# Patient Record
Sex: FEMALE | Race: WHITE | NOT HISPANIC OR LATINO | URBAN - METROPOLITAN AREA
[De-identification: names, ages, dates, MRNs, and addresses within clinical notes are randomized per-mention and may not be internally consistent; named-entity substitution may affect disease eponyms.]

---

## 2018-04-13 ENCOUNTER — OFFICE (OUTPATIENT)
Dept: URBAN - METROPOLITAN AREA CLINIC 33 | Facility: CLINIC | Age: 43
End: 2018-04-13

## 2018-04-13 VITALS
TEMPERATURE: 97.7 F | HEIGHT: 69 IN | HEART RATE: 79 BPM | DIASTOLIC BLOOD PRESSURE: 93 MMHG | WEIGHT: 165 LBS | SYSTOLIC BLOOD PRESSURE: 135 MMHG

## 2018-04-13 DIAGNOSIS — K50.80 CROHN'S DISEASE OF BOTH SMALL AND LARGE INTESTINE WITHOUT CO: ICD-10-CM

## 2018-04-13 PROCEDURE — 99214 OFFICE O/P EST MOD 30 MIN: CPT

## 2018-04-13 RX ORDER — AZATHIOPRINE 50 MG/1
TABLET ORAL
Qty: 60 | Refills: 3 | Status: COMPLETED
End: 2022-09-23

## 2018-04-13 NOTE — SERVICEHPINOTES
INEZ CENTENO   is a   42  female who presents for Crohn's f/u. She was first diagnosed with Crohn's in 1994. Her last colonoscopy was 11/29/2016-no active colitis with biopsies throughout colon-repeat in 2 years. She has been taking Imuran 50mg 2 tablets since around 1997 or 1998. No side effects. She tried going off it in 2008 because she was trying to get pregnant and she got really sick and went back on it. She has a BM 2-3 times a day. Stools are [place] stool scale type 4-5. No blood present. She has been in remission since 2005 after she had her daughter. Denies joint pain, night sweats, abdominal pain and weight loss. No vision complaints. She quit smoking in 2003. She is up to date the Pap smear. She hasn't had the flu vaccine.

## 2019-04-24 ENCOUNTER — OFFICE (OUTPATIENT)
Dept: URBAN - METROPOLITAN AREA CLINIC 33 | Facility: CLINIC | Age: 44
End: 2019-04-24

## 2019-04-24 VITALS
DIASTOLIC BLOOD PRESSURE: 79 MMHG | WEIGHT: 164 LBS | HEART RATE: 85 BPM | TEMPERATURE: 97.9 F | SYSTOLIC BLOOD PRESSURE: 124 MMHG | HEIGHT: 69 IN

## 2019-04-24 DIAGNOSIS — K50.80 CROHN'S DISEASE OF BOTH SMALL AND LARGE INTESTINE WITHOUT CO: ICD-10-CM

## 2019-04-24 DIAGNOSIS — K21.9 GASTRO-ESOPHAGEAL REFLUX DISEASE WITHOUT ESOPHAGITIS: ICD-10-CM

## 2019-04-24 PROCEDURE — 99214 OFFICE O/P EST MOD 30 MIN: CPT

## 2019-04-24 PROCEDURE — 00031: CPT

## 2019-06-18 ENCOUNTER — OFFICE (OUTPATIENT)
Dept: URBAN - METROPOLITAN AREA PATHOLOGY 17 | Facility: PATHOLOGY | Age: 44
End: 2019-06-18

## 2019-06-18 ENCOUNTER — OFFICE (OUTPATIENT)
Dept: URBAN - METROPOLITAN AREA CLINIC 32 | Facility: CLINIC | Age: 44
End: 2019-06-18

## 2019-06-18 VITALS
SYSTOLIC BLOOD PRESSURE: 114 MMHG | OXYGEN SATURATION: 98 % | DIASTOLIC BLOOD PRESSURE: 75 MMHG | HEART RATE: 78 BPM | HEART RATE: 83 BPM | RESPIRATION RATE: 13 BRPM | RESPIRATION RATE: 16 BRPM | DIASTOLIC BLOOD PRESSURE: 102 MMHG | DIASTOLIC BLOOD PRESSURE: 74 MMHG | SYSTOLIC BLOOD PRESSURE: 117 MMHG | HEART RATE: 77 BPM | SYSTOLIC BLOOD PRESSURE: 111 MMHG | HEART RATE: 91 BPM | DIASTOLIC BLOOD PRESSURE: 91 MMHG | RESPIRATION RATE: 15 BRPM | DIASTOLIC BLOOD PRESSURE: 92 MMHG | TEMPERATURE: 97.5 F | HEIGHT: 69 IN | HEART RATE: 71 BPM | TEMPERATURE: 98.1 F | OXYGEN SATURATION: 100 % | RESPIRATION RATE: 14 BRPM | DIASTOLIC BLOOD PRESSURE: 79 MMHG | DIASTOLIC BLOOD PRESSURE: 71 MMHG | HEART RATE: 81 BPM | SYSTOLIC BLOOD PRESSURE: 135 MMHG | SYSTOLIC BLOOD PRESSURE: 153 MMHG | RESPIRATION RATE: 12 BRPM | DIASTOLIC BLOOD PRESSURE: 84 MMHG | SYSTOLIC BLOOD PRESSURE: 118 MMHG | SYSTOLIC BLOOD PRESSURE: 137 MMHG | WEIGHT: 164 LBS | SYSTOLIC BLOOD PRESSURE: 119 MMHG | DIASTOLIC BLOOD PRESSURE: 80 MMHG | DIASTOLIC BLOOD PRESSURE: 76 MMHG | SYSTOLIC BLOOD PRESSURE: 127 MMHG | HEART RATE: 80 BPM

## 2019-06-18 DIAGNOSIS — K21.9 GASTRO-ESOPHAGEAL REFLUX DISEASE WITHOUT ESOPHAGITIS: ICD-10-CM

## 2019-06-18 DIAGNOSIS — R14.1 GAS PAIN: ICD-10-CM

## 2019-06-18 DIAGNOSIS — K29.60 OTHER GASTRITIS WITHOUT BLEEDING: ICD-10-CM

## 2019-06-18 DIAGNOSIS — K62.89 OTHER SPECIFIED DISEASES OF ANUS AND RECTUM: ICD-10-CM

## 2019-06-18 DIAGNOSIS — K50.80 CROHN'S DISEASE OF BOTH SMALL AND LARGE INTESTINE WITHOUT CO: ICD-10-CM

## 2019-06-18 DIAGNOSIS — K63.89 OTHER SPECIFIED DISEASES OF INTESTINE: ICD-10-CM

## 2019-06-18 PROBLEM — K44.9 DIAPHRAGMATIC HERNIA WITHOUT OBSTRUCTION OR GANGRENE: Status: ACTIVE | Noted: 2019-06-18

## 2019-06-18 PROBLEM — K31.89 OTHER DISEASES OF STOMACH AND DUODENUM: Status: ACTIVE | Noted: 2019-06-18

## 2019-06-18 PROBLEM — K29.70 GASTRITIS, UNSPECIFIED, WITHOUT BLEEDING: Status: ACTIVE | Noted: 2019-06-18

## 2019-06-18 PROCEDURE — 45380 COLONOSCOPY AND BIOPSY: CPT

## 2019-06-18 PROCEDURE — 88305 TISSUE EXAM BY PATHOLOGIST: CPT

## 2019-06-18 PROCEDURE — 88313 SPECIAL STAINS GROUP 2: CPT

## 2019-06-18 PROCEDURE — 88312 SPECIAL STAINS GROUP 1: CPT

## 2019-06-18 PROCEDURE — 43239 EGD BIOPSY SINGLE/MULTIPLE: CPT

## 2019-06-18 PROCEDURE — 88342 IMHCHEM/IMCYTCHM 1ST ANTB: CPT

## 2020-06-08 ENCOUNTER — TELEHEALTH PROVIDED OTHER THAN IN PATIENT'S HOME (OUTPATIENT)
Dept: URBAN - METROPOLITAN AREA TELEHEALTH 3 | Facility: TELEHEALTH | Age: 45
End: 2020-06-08

## 2020-06-08 VITALS — WEIGHT: 145 LBS | HEIGHT: 69 IN

## 2020-06-08 DIAGNOSIS — K50.80 CROHN'S DISEASE OF BOTH SMALL AND LARGE INTESTINE WITHOUT CO: ICD-10-CM

## 2020-06-08 DIAGNOSIS — D64.9 ANEMIA, UNSPECIFIED: ICD-10-CM

## 2020-06-08 PROCEDURE — 99214 OFFICE O/P EST MOD 30 MIN: CPT | Mod: 95

## 2020-06-08 RX ORDER — AZATHIOPRINE 50 MG/1
TABLET ORAL
Qty: 60 | Refills: 3 | Status: COMPLETED
End: 2022-09-23

## 2020-06-08 NOTE — SERVICENOTES
I have reviewed the history, physical exam, assessment and management plans.  I concur with or have edited all elements of her note.

 Patient's visit was conducted through [employer]. Patient consented before the start of visit as to understanding of privacy concerns, possible technological failure, and their responsibility of carrying out instructions of plan.

## 2020-06-08 NOTE — SERVICEHPINOTES
PATIENT VERIFIED BY DATE OF BIRTH AND NAME. Patient has been consented for this telecommunication visit. Dori presents for crohn's disease follow up. She was first diagnosed with Crohn's in 1994. Has been on Imuran 100mg qd since around 1998. Her last colonoscopy was 6/2018 normal colon and TI biopsies, recall 2 years. EGD done at the same time for GERD sx showed mild gastritis and reflux changes but no IM/ansari's.Ondina denies any ADR's. No flares since she had her daughter 15 years ago. BMs regular and daily. Denies diarrhea, constipation, rectal bleeding, abd pain, weight loss. No joint pain, rashes, or vision complaints. Her daughter, age 15, also has Crohn's. Ondina continues on omeprazole 20mg qd for GERD symptoms. She actually stopped drinking alcohol completely last July and has not had any issues with heartburn. was drinking wine nightly. Denies abd pain, dysphagia, or n/v. Ondina just completed labs a couple days ago--normal CMP, hgb-10.4 with normal MCV, plt 428, normal WBCs. Hgb in November was 11.1. She reports having heavy periods and actually had 2 this past month. Was still on period when she had labs drawn. She has not yet seen ob/gyn about this. Denies melena or rectal bleeding. BRFONT style="BACKGROUND-COLOR: #ffffcc" visited="true"BRROS as per HPI, otherwise negative./FONT

## 2020-06-08 NOTE — INTERFACERESULTNOTES
patient aware and when she comes back from out of town she will have other lab drawn, and schedule f/u with doctor

## 2020-07-24 LAB
CBC (INCLUDES DIFF/PLT): ABSOLUTE BAND NEUTROPHILS: NORMAL CELLS/UL
CBC (INCLUDES DIFF/PLT): ABSOLUTE BASOPHILS: 49 CELLS/UL (ref 0–200)
CBC (INCLUDES DIFF/PLT): ABSOLUTE BLASTS: NORMAL CELLS/UL
CBC (INCLUDES DIFF/PLT): ABSOLUTE EOSINOPHILS: 79 CELLS/UL (ref 15–500)
CBC (INCLUDES DIFF/PLT): ABSOLUTE LYMPHOCYTES: 1257 CELLS/UL (ref 850–3900)
CBC (INCLUDES DIFF/PLT): ABSOLUTE METAMYELOCYTES: NORMAL CELLS/UL
CBC (INCLUDES DIFF/PLT): ABSOLUTE MONOCYTES: 360 CELLS/UL (ref 200–950)
CBC (INCLUDES DIFF/PLT): ABSOLUTE MYELOCYTES: NORMAL CELLS/UL
CBC (INCLUDES DIFF/PLT): ABSOLUTE NEUTROPHILS: 4355 CELLS/UL (ref 1500–7800)
CBC (INCLUDES DIFF/PLT): ABSOLUTE NUCLEATED RBC: NORMAL CELLS/UL
CBC (INCLUDES DIFF/PLT): ABSOLUTE PROMYELOCYTES: NORMAL CELLS/UL
CBC (INCLUDES DIFF/PLT): BAND NEUTROPHILS: NORMAL %
CBC (INCLUDES DIFF/PLT): BASOPHILS: 0.8 %
CBC (INCLUDES DIFF/PLT): BLASTS: NORMAL %
CBC (INCLUDES DIFF/PLT): COMMENT(S): NORMAL
CBC (INCLUDES DIFF/PLT): EOSINOPHILS: 1.3 %
CBC (INCLUDES DIFF/PLT): HEMATOCRIT: 31.3 % — LOW (ref 35–45)
CBC (INCLUDES DIFF/PLT): HEMOGLOBIN: 10.2 G/DL — LOW (ref 11.7–15.5)
CBC (INCLUDES DIFF/PLT): LYMPHOCYTES: 20.6 %
CBC (INCLUDES DIFF/PLT): MCH: 27.8 PG (ref 27–33)
CBC (INCLUDES DIFF/PLT): MCHC: 32.6 G/DL (ref 32–36)
CBC (INCLUDES DIFF/PLT): MCV: 85.3 FL (ref 80–100)
CBC (INCLUDES DIFF/PLT): METAMYELOCYTES: NORMAL %
CBC (INCLUDES DIFF/PLT): MONOCYTES: 5.9 %
CBC (INCLUDES DIFF/PLT): MPV: 11.3 FL (ref 7.5–12.5)
CBC (INCLUDES DIFF/PLT): MYELOCYTES: NORMAL %
CBC (INCLUDES DIFF/PLT): NEUTROPHILS: 71.4 %
CBC (INCLUDES DIFF/PLT): NUCLEATED RBC: NORMAL /100 WBC
CBC (INCLUDES DIFF/PLT): PLATELET COUNT: 368 THOUSAND/UL (ref 140–400)
CBC (INCLUDES DIFF/PLT): PROMYELOCYTES: NORMAL %
CBC (INCLUDES DIFF/PLT): RDW: 15.1 % — HIGH (ref 11–15)
CBC (INCLUDES DIFF/PLT): REACTIVE LYMPHOCYTES: NORMAL %
CBC (INCLUDES DIFF/PLT): RED BLOOD CELL COUNT: 3.67 MILLION/UL — LOW (ref 3.8–5.1)
CBC (INCLUDES DIFF/PLT): WHITE BLOOD CELL COUNT: 6.1 THOUSAND/UL (ref 3.8–10.8)
FERRITIN: 5 NG/ML — LOW (ref 16–232)
IRON AND TOTAL IRON BINDING CAPACITY: % SATURATION: 11 % (CALC) — LOW (ref 16–45)
IRON AND TOTAL IRON BINDING CAPACITY: IRON BINDING CAPACITY: 395 MCG/DL (CALC) (ref 250–450)
IRON AND TOTAL IRON BINDING CAPACITY: IRON, TOTAL: 45 MCG/DL (ref 40–190)
VITAMIN B12/FOLATE, SERUM PANEL: FOLATE, SERUM: 7.7 NG/ML
VITAMIN B12/FOLATE, SERUM PANEL: VITAMIN B12: 372 PG/ML (ref 200–1100)

## 2021-01-15 ENCOUNTER — OFFICE (OUTPATIENT)
Dept: URBAN - METROPOLITAN AREA CLINIC 34 | Facility: CLINIC | Age: 46
End: 2021-01-15

## 2021-01-15 VITALS
HEART RATE: 72 BPM | WEIGHT: 145 LBS | SYSTOLIC BLOOD PRESSURE: 129 MMHG | TEMPERATURE: 97.8 F | DIASTOLIC BLOOD PRESSURE: 88 MMHG | HEIGHT: 69 IN

## 2021-01-15 DIAGNOSIS — D64.9 ANEMIA, UNSPECIFIED: ICD-10-CM

## 2021-01-15 DIAGNOSIS — K50.90 CROHN'S DISEASE, UNSPECIFIED, WITHOUT COMPLICATIONS: ICD-10-CM

## 2021-01-15 LAB
AMBIG ABBREV CMP14 DEFAULT: (no result)
CBC/DIFF AMBIGUOUS DEFAULT: BASO (ABSOLUTE): 0.1 X10E3/UL (ref 0–0.2)
CBC/DIFF AMBIGUOUS DEFAULT: BASOS: 1 %
CBC/DIFF AMBIGUOUS DEFAULT: EOS (ABSOLUTE): 0.1 X10E3/UL (ref 0–0.4)
CBC/DIFF AMBIGUOUS DEFAULT: EOS: 1 %
CBC/DIFF AMBIGUOUS DEFAULT: HEMATOCRIT: 39 % (ref 34–46.6)
CBC/DIFF AMBIGUOUS DEFAULT: HEMOGLOBIN: 12.9 G/DL (ref 11.1–15.9)
CBC/DIFF AMBIGUOUS DEFAULT: IMMATURE GRANS (ABS): 0 X10E3/UL (ref 0–0.1)
CBC/DIFF AMBIGUOUS DEFAULT: IMMATURE GRANULOCYTES: 0 %
CBC/DIFF AMBIGUOUS DEFAULT: LYMPHS (ABSOLUTE): 1.2 X10E3/UL (ref 0.7–3.1)
CBC/DIFF AMBIGUOUS DEFAULT: LYMPHS: 18 %
CBC/DIFF AMBIGUOUS DEFAULT: MCH: 29.9 PG (ref 26.6–33)
CBC/DIFF AMBIGUOUS DEFAULT: MCHC: 33.1 G/DL (ref 31.5–35.7)
CBC/DIFF AMBIGUOUS DEFAULT: MCV: 90 FL (ref 79–97)
CBC/DIFF AMBIGUOUS DEFAULT: MONOCYTES(ABSOLUTE): 0.4 X10E3/UL (ref 0.1–0.9)
CBC/DIFF AMBIGUOUS DEFAULT: MONOCYTES: 6 %
CBC/DIFF AMBIGUOUS DEFAULT: NEUTROPHILS (ABSOLUTE): 4.9 X10E3/UL (ref 1.4–7)
CBC/DIFF AMBIGUOUS DEFAULT: NEUTROPHILS: 74 %
CBC/DIFF AMBIGUOUS DEFAULT: PLATELETS: 336 X10E3/UL (ref 150–450)
CBC/DIFF AMBIGUOUS DEFAULT: RBC: 4.32 X10E6/UL (ref 3.77–5.28)
CBC/DIFF AMBIGUOUS DEFAULT: RDW: 14.2 % (ref 11.7–15.4)
CBC/DIFF AMBIGUOUS DEFAULT: WBC: 6.6 X10E3/UL (ref 3.4–10.8)
COMP. METABOLIC PANEL (14): A/G RATIO: 1.7 (ref 1.2–2.2)
COMP. METABOLIC PANEL (14): ALBUMIN: 4.6 G/DL (ref 3.8–4.8)
COMP. METABOLIC PANEL (14): ALKALINE PHOSPHATASE: 45 IU/L (ref 39–117)
COMP. METABOLIC PANEL (14): ALT (SGPT): 6 IU/L (ref 0–32)
COMP. METABOLIC PANEL (14): AST (SGOT): 12 IU/L (ref 0–40)
COMP. METABOLIC PANEL (14): BILIRUBIN, TOTAL: 0.4 MG/DL (ref 0–1.2)
COMP. METABOLIC PANEL (14): BUN/CREATININE RATIO: 11 (ref 9–23)
COMP. METABOLIC PANEL (14): BUN: 9 MG/DL (ref 6–24)
COMP. METABOLIC PANEL (14): CALCIUM: 9.6 MG/DL (ref 8.7–10.2)
COMP. METABOLIC PANEL (14): CARBON DIOXIDE, TOTAL: 23 MMOL/L (ref 20–29)
COMP. METABOLIC PANEL (14): CHLORIDE: 104 MMOL/L (ref 96–106)
COMP. METABOLIC PANEL (14): CREATININE: 0.79 MG/DL (ref 0.57–1)
COMP. METABOLIC PANEL (14): EGFR IF AFRICN AM: 104 ML/MIN/1.73 (ref 59–?)
COMP. METABOLIC PANEL (14): EGFR IF NONAFRICN AM: 90 ML/MIN/1.73 (ref 59–?)
COMP. METABOLIC PANEL (14): GLOBULIN, TOTAL: 2.7 G/DL (ref 1.5–4.5)
COMP. METABOLIC PANEL (14): GLUCOSE: 101 MG/DL — HIGH (ref 65–99)
COMP. METABOLIC PANEL (14): POTASSIUM: 3.9 MMOL/L (ref 3.5–5.2)
COMP. METABOLIC PANEL (14): PROTEIN, TOTAL: 7.3 G/DL (ref 6–8.5)
COMP. METABOLIC PANEL (14): SODIUM: 140 MMOL/L (ref 134–144)
FERRITIN, SERUM: 21 NG/ML (ref 15–150)
IRON AND TIBC: IRON BIND.CAP.(TIBC): 370 UG/DL (ref 250–450)
IRON AND TIBC: IRON SATURATION: 26 % (ref 15–55)
IRON AND TIBC: IRON: 97 UG/DL (ref 27–159)
IRON AND TIBC: UIBC: 273 UG/DL (ref 131–425)
THIOPURINE METABOLITES: 6-MMPN: 663 PMOL/8X 10E8
THIOPURINE METABOLITES: 6-TGN: 171 PMOL/8X 10E8
VITAMIN B12: 378 PG/ML (ref 232–1245)

## 2021-01-15 PROCEDURE — 99214 OFFICE O/P EST MOD 30 MIN: CPT

## 2021-01-15 NOTE — SERVICEHPINOTES
INEZ CENTENO   is a   45  female who presents for crohn's follow up. She was first diagnosed with Crohn's in 1994. Has been on Imuran 100mg qd since around 1998. Her last colonoscopy was 6/2019 normal colon and TI biopsies, recall 2 years. EGD done at the same time for GERD sx showed mild gastritis and reflux changes but no IM/ansari's.Ondina denies any ADR's. No flares since she had her daughter 15 years ago. BMs regular and daily. Denies diarrhea, rectal bleeding, abd pain. No joint pain, rashes, or vision complaint. She has occasional constipation and takes miralax prn which works well for her. She has lost weigh recently, although this has been intentional, has been walking more.  Her daughter, age 15, also has Crohn's. Ondina was found to have anemia on labs this past summer with hgb of 10.4 in June. Repeat labs 7/23/20 with hgb of 10.2, ferritin 5, iron sat 11% normal b12 and folate. She was recommended to start PO iron and thiopurine metabolites were also ordered. She never had the thiopurine metabolites drawn, thinks she forgot but willing to do now. She was on vitron c 1 pill daily for approx. 1 month recently but ran out 2 weeks ago and has not yet restarted. Her menstrual cycle is still irregular and heavy at times. She is scheduled for shoulder surgery on 1/25 (torn labrum, frozen shoulder, etc).

## 2021-12-10 ENCOUNTER — TELEHEALTH PROVIDED OTHER THAN IN PATIENT'S HOME (OUTPATIENT)
Dept: URBAN - METROPOLITAN AREA TELEHEALTH 12 | Facility: TELEHEALTH | Age: 46
End: 2021-12-10
Payer: COMMERCIAL

## 2021-12-10 VITALS — WEIGHT: 145 LBS | HEIGHT: 69 IN

## 2021-12-10 DIAGNOSIS — K59.00 CONSTIPATION, UNSPECIFIED: ICD-10-CM

## 2021-12-10 DIAGNOSIS — D50.9 IRON DEFICIENCY ANEMIA, UNSPECIFIED: ICD-10-CM

## 2021-12-10 DIAGNOSIS — K21.9 GASTRO-ESOPHAGEAL REFLUX DISEASE WITHOUT ESOPHAGITIS: ICD-10-CM

## 2021-12-10 DIAGNOSIS — D64.9 ANEMIA, UNSPECIFIED: ICD-10-CM

## 2021-12-10 DIAGNOSIS — K50.90 CROHN'S DISEASE, UNSPECIFIED, WITHOUT COMPLICATIONS: ICD-10-CM

## 2021-12-10 PROCEDURE — 99214 OFFICE O/P EST MOD 30 MIN: CPT | Mod: 95 | Performed by: INTERNAL MEDICINE

## 2021-12-10 RX ORDER — AZATHIOPRINE 50 MG/1
TABLET ORAL
Qty: 180 | Refills: 4 | Status: ACTIVE
Start: 2021-12-10

## 2021-12-10 RX ORDER — OMEPRAZOLE 20 MG/1
CAPSULE, DELAYED RELEASE ORAL
Qty: 90 | Refills: 4 | Status: ACTIVE

## 2021-12-10 NOTE — SERVICEHPINOTES
PATIENT VERIFIED BY DATE OF BIRTH AND NAME. Patient has been consented for this telecommunication visit.   45 yo WF  has hx of Crohn's has been doing well for years and denies flare this year. She has noted more constipation and is taking Miralax and admits to being menopausal. She is eating ok.SHe denies rectal b

## 2022-09-23 ENCOUNTER — TELEHEALTH PROVIDED IN PATIENT'S HOME (OUTPATIENT)
Dept: URBAN - METROPOLITAN AREA TELEHEALTH 3 | Facility: TELEHEALTH | Age: 47
End: 2022-09-23

## 2022-09-23 VITALS — HEIGHT: 69 IN | WEIGHT: 150 LBS

## 2022-09-23 DIAGNOSIS — K21.9 GASTRO-ESOPHAGEAL REFLUX DISEASE WITHOUT ESOPHAGITIS: ICD-10-CM

## 2022-09-23 DIAGNOSIS — D50.9 IRON DEFICIENCY ANEMIA, UNSPECIFIED: ICD-10-CM

## 2022-09-23 DIAGNOSIS — K50.90 CROHN'S DISEASE, UNSPECIFIED, WITHOUT COMPLICATIONS: ICD-10-CM

## 2022-09-23 PROCEDURE — 99214 OFFICE O/P EST MOD 30 MIN: CPT | Mod: 95 | Performed by: INTERNAL MEDICINE

## 2022-09-23 NOTE — SERVICEHPINOTES
PATIENT VERIFIED BY DATE OF BIRTH AND NAME. Patient has been consented for this telecommunication visit.   46 yo female was last seen last year has hx of Crohn's doing well on Imuran and iron deficient anemia and was recommended but did not receive iron infusion last year as not sure if covered by insurance. She last had colonoscopy in 2019 that showed chronic scarring but no signs of active inflammation. She denies abdominal pain or diarrhea, rectal bleeding or melena. She is eating well, weight is stable. She is having heavy menses as going through premenopause.

## 2022-10-11 ENCOUNTER — OFFICE (OUTPATIENT)
Dept: URBAN - METROPOLITAN AREA CLINIC 102 | Facility: CLINIC | Age: 47
End: 2022-10-11
Payer: COMMERCIAL

## 2022-10-11 DIAGNOSIS — D50.9 IRON DEFICIENCY ANEMIA, UNSPECIFIED: ICD-10-CM

## 2022-10-11 PROCEDURE — 96365 THER/PROPH/DIAG IV INF INIT: CPT | Performed by: INTERNAL MEDICINE

## 2022-10-18 ENCOUNTER — OFFICE (OUTPATIENT)
Dept: URBAN - METROPOLITAN AREA CLINIC 102 | Facility: CLINIC | Age: 47
End: 2022-10-18
Payer: COMMERCIAL

## 2022-10-18 DIAGNOSIS — D50.9 IRON DEFICIENCY ANEMIA, UNSPECIFIED: ICD-10-CM

## 2022-10-18 PROCEDURE — 96365 THER/PROPH/DIAG IV INF INIT: CPT | Performed by: INTERNAL MEDICINE
